# Patient Record
Sex: MALE | Race: WHITE
[De-identification: names, ages, dates, MRNs, and addresses within clinical notes are randomized per-mention and may not be internally consistent; named-entity substitution may affect disease eponyms.]

---

## 2017-12-02 ENCOUNTER — HOSPITAL ENCOUNTER (INPATIENT)
Dept: HOSPITAL 92 - ERS | Age: 62
LOS: 4 days | Discharge: HOME | DRG: 100 | End: 2017-12-06
Attending: INTERNAL MEDICINE | Admitting: INTERNAL MEDICINE
Payer: COMMERCIAL

## 2017-12-02 VITALS — BODY MASS INDEX: 18.8 KG/M2

## 2017-12-02 DIAGNOSIS — F10.230: ICD-10-CM

## 2017-12-02 DIAGNOSIS — I10: ICD-10-CM

## 2017-12-02 DIAGNOSIS — E86.0: ICD-10-CM

## 2017-12-02 DIAGNOSIS — G40.409: Primary | ICD-10-CM

## 2017-12-02 DIAGNOSIS — D72.829: ICD-10-CM

## 2017-12-02 DIAGNOSIS — Z87.891: ICD-10-CM

## 2017-12-02 DIAGNOSIS — Z87.81: ICD-10-CM

## 2017-12-02 DIAGNOSIS — J44.9: ICD-10-CM

## 2017-12-02 DIAGNOSIS — G93.41: ICD-10-CM

## 2017-12-02 DIAGNOSIS — E87.1: ICD-10-CM

## 2017-12-02 DIAGNOSIS — E87.2: ICD-10-CM

## 2017-12-02 DIAGNOSIS — E87.6: ICD-10-CM

## 2017-12-02 DIAGNOSIS — Z88.8: ICD-10-CM

## 2017-12-02 LAB
ALP SERPL-CCNC: 81 U/L (ref 40–150)
ALT SERPL W P-5'-P-CCNC: 31 U/L (ref 8–55)
ANION GAP SERPL CALC-SCNC: 18 MMOL/L (ref 10–20)
AST SERPL-CCNC: 44 U/L (ref 5–34)
BACTERIA UR QL AUTO: (no result) HPF
BASOPHILS # BLD AUTO: 0 THOU/UL (ref 0–0.2)
BASOPHILS NFR BLD AUTO: 0.2 % (ref 0–1)
BILIRUB SERPL-MCNC: 1.3 MG/DL (ref 0.2–1.2)
BUN SERPL-MCNC: 6 MG/DL (ref 8.4–25.7)
CALCIUM SERPL-MCNC: 9.8 MG/DL (ref 7.8–10.44)
CHLORIDE SERPL-SCNC: 102 MMOL/L (ref 98–107)
CO2 SERPL-SCNC: 20 MMOL/L (ref 23–31)
CREAT CL PREDICTED SERPL C-G-VRATE: 0 ML/MIN (ref 70–130)
EOSINOPHIL # BLD AUTO: 0 THOU/UL (ref 0–0.7)
EOSINOPHIL NFR BLD AUTO: 0.1 % (ref 0–10)
GLOBULIN SER CALC-MCNC: 3.7 G/DL (ref 2.4–3.5)
GLUCOSE CSF-MCNC: 88 MG/DL (ref 40–70)
HCT VFR BLD CALC: 48.4 % (ref 42–52)
HYALINE CASTS #/AREA URNS LPF: (no result) LPF
LACTATE SERPL-SCNC: 5 MMOL/L (ref 0.5–2.2)
LYMPHOCYTES # BLD: 1 THOU/UL (ref 1.2–3.4)
LYMPHOCYTES NFR BLD AUTO: 7 % (ref 21–51)
MONOCYTES # BLD AUTO: 0.7 THOU/UL (ref 0.11–0.59)
MONOCYTES NFR BLD AUTO: 4.9 % (ref 0–10)
NEUTROPHILS # BLD AUTO: 12.8 THOU/UL (ref 1.4–6.5)
PROT UR STRIP.AUTO-MCNC: 300 MG/DL
RBC # BLD AUTO: 4.69 MILL/UL (ref 4.7–6.1)
RBC UR QL AUTO: (no result) HPF (ref 0–3)
WBC # BLD AUTO: 14.6 THOU/UL (ref 4.8–10.8)
WBC UR QL AUTO: (no result) HPF (ref 0–3)

## 2017-12-02 PROCEDURE — 70450 CT HEAD/BRAIN W/O DYE: CPT

## 2017-12-02 PROCEDURE — 96375 TX/PRO/DX INJ NEW DRUG ADDON: CPT

## 2017-12-02 PROCEDURE — 71010: CPT

## 2017-12-02 PROCEDURE — 80053 COMPREHEN METABOLIC PANEL: CPT

## 2017-12-02 PROCEDURE — A4216 STERILE WATER/SALINE, 10 ML: HCPCS

## 2017-12-02 PROCEDURE — 84157 ASSAY OF PROTEIN OTHER: CPT

## 2017-12-02 PROCEDURE — 82746 ASSAY OF FOLIC ACID SERUM: CPT

## 2017-12-02 PROCEDURE — 87205 SMEAR GRAM STAIN: CPT

## 2017-12-02 PROCEDURE — 009U3ZX DRAINAGE OF SPINAL CANAL, PERCUTANEOUS APPROACH, DIAGNOSTIC: ICD-10-PCS | Performed by: RADIOLOGY

## 2017-12-02 PROCEDURE — 81003 URINALYSIS AUTO W/O SCOPE: CPT

## 2017-12-02 PROCEDURE — 82945 GLUCOSE OTHER FLUID: CPT

## 2017-12-02 PROCEDURE — 87070 CULTURE OTHR SPECIMN AEROBIC: CPT

## 2017-12-02 PROCEDURE — 96365 THER/PROPH/DIAG IV INF INIT: CPT

## 2017-12-02 PROCEDURE — 83735 ASSAY OF MAGNESIUM: CPT

## 2017-12-02 PROCEDURE — 93005 ELECTROCARDIOGRAM TRACING: CPT

## 2017-12-02 PROCEDURE — 80048 BASIC METABOLIC PNL TOTAL CA: CPT

## 2017-12-02 PROCEDURE — 95819 EEG AWAKE AND ASLEEP: CPT

## 2017-12-02 PROCEDURE — 84100 ASSAY OF PHOSPHORUS: CPT

## 2017-12-02 PROCEDURE — 36415 COLL VENOUS BLD VENIPUNCTURE: CPT

## 2017-12-02 PROCEDURE — 87040 BLOOD CULTURE FOR BACTERIA: CPT

## 2017-12-02 PROCEDURE — S0028 INJECTION, FAMOTIDINE, 20 MG: HCPCS

## 2017-12-02 PROCEDURE — 89051 BODY FLUID CELL COUNT: CPT

## 2017-12-02 PROCEDURE — 62270 DX LMBR SPI PNXR: CPT

## 2017-12-02 PROCEDURE — 80307 DRUG TEST PRSMV CHEM ANLYZR: CPT

## 2017-12-02 PROCEDURE — 95816 EEG AWAKE AND DROWSY: CPT

## 2017-12-02 PROCEDURE — 81015 MICROSCOPIC EXAM OF URINE: CPT

## 2017-12-02 PROCEDURE — 96376 TX/PRO/DX INJ SAME DRUG ADON: CPT

## 2017-12-02 PROCEDURE — 96361 HYDRATE IV INFUSION ADD-ON: CPT

## 2017-12-02 PROCEDURE — 85025 COMPLETE CBC W/AUTO DIFF WBC: CPT

## 2017-12-02 PROCEDURE — 70553 MRI BRAIN STEM W/O & W/DYE: CPT

## 2017-12-02 PROCEDURE — 51701 INSERT BLADDER CATHETER: CPT

## 2017-12-02 PROCEDURE — 82607 VITAMIN B-12: CPT

## 2017-12-02 PROCEDURE — 80202 ASSAY OF VANCOMYCIN: CPT

## 2017-12-02 PROCEDURE — 83605 ASSAY OF LACTIC ACID: CPT

## 2017-12-02 RX ADMIN — POTASSIUM CHLORIDE SCH MLS: 149 INJECTION, SOLUTION, CONCENTRATE INTRAVENOUS at 22:15

## 2017-12-02 RX ADMIN — Medication SCH ML: at 22:39

## 2017-12-02 RX ADMIN — FAMOTIDINE SCH MG: 10 INJECTION, SOLUTION INTRAVENOUS at 22:39

## 2017-12-02 NOTE — HP
HISTORY OF PRESENT ILLNESS:  Mr. Sarah is a 62-year-old  male.  He was in his usual state 
of health until he developed 3 episodes of seizure earlier today and subsequently, he was also notice
d to be febrile.  In view of this fact, it was felt that he may have meningitis and radiologist was c
onsulted by the ER physician for possible lumbar tap.  He is known to have history of seizure disorde
r as a child.  Last time; however, he has not been on any medication and he had one episode of seizur
e about 3 months ago.

 

PAST MEDICAL HISTORY:

History is obtained from his wife.  He is currently postictal.  He is known to have any history of hy
pertension, COPD, atrial fibrillation.

 

PAST SURGICAL HISTORY:  Remarkable for open reduction internal fixation of the right arm fracture and
 also he had some oral surgery.  He does not have any known allergy; however, he describes some side 
effect to Ativan, mainly agitation.

 

SOCIAL HISTORY:  He is a former smoker.  We could not quantify his smoking habit.  He does have a his
tory of ETOH abuse and according to the wife, he drinks 5-8 cans of beer a day.

 

MEDICATIONS:  His home medications are not available for identification at this time; however, his wi
fe describes some arrhythmia for which he has been on Eliquis.

 

REVIEW OF SYSTEM:  As we mentioned earlier, generalized seizure earlier today multiple 3 episodes fol
lowed by fever.  All other systems were reviewed and are negative.

 

PHYSICAL EXAMINATION:

GENERAL:  At the current time he is drowsy, irritable and confused.

VITAL SIGNS:  Temperature of 102 with pulse rate 116, respiratory rate 28, blood pressure 190/105.

HEENT:  Normocephalic and atraumatic.  Both his pupils are equal, reacting.  Ears and nose normal.  O
ral mucosa is moist.  We could not see his pharyngeal area.  He is not cooperating.

NECK:  Supple.  There is no distention of the jugular veins.  No lymphadenopathy felt.  Thyroid gland
 not palpable.  There is no carotid bruit.

CHEST:  Symmetrical with regular S1, S2.

LUNGS:  Clear.

ABDOMEN:  Soft.  Bowel sounds heard.  We could not appreciate any organomegaly.  There is no focal ar
ea of tenderness, 

EXTREMITIES:  Limbs show no edema.  Neurologically, he moves all extremities.

 

LABORATORY DATA:  His CBC showed WBC of 14.6, hemoglobin of 15.8, hematocrit of 48.4, MCV of 103, denys
telet of 176 with 87.7% of polynuclear neutrophils.  Chemistry and lytes showed a sodium of 136, pota
ssium 4.4, chloride 102, CO2 of 20, BUN 6, creatinine 0.91, glucose 126, lactic acid 5, calcium 9.8, 
total bilirubin 1.3, AST 44, ALT 31, alkaline phosphatase 81, total protein 7.9, albumin 4.2.  Urinal
ysis shows specific gravity of 1.019, pH of 6, protein 300 mg per deciliter, ketone positive, blood m
oderate, nitrite negative, leukocyte esterase negative, 4-6 rbc's, 0-3 wbc's, 0-3 epithelial cells, 0
-3, hyaline cast.  Alcohol level is less than 10.  Head CT done earlier was reported to show no acute
 intracranial abnormality.  Chest x-ray was reported to show no active intrathoracic disease.

 

ASSESSMENT:  This is a 62-year-old  male with history of hypertension, COPD, possible atrial
 fibrillation, ETOH abuse, who is being admitted with new onset seizure, was noticed to have high gra
de fever.  He is currently being treated for possible meningitis; however, we discussed the possibili
ty of alcohol withdrawal with seizure cannot be excluded at this time.

 

Radiologist was consulted by the ER physician for lumbar tap.  He is aware that the patient was on [place] and he is agreeable to help us with lumbar tap.  The patient will be admitted to medical ICU.  
He will be started on ceftriaxone and vancomycin.  We will also treat him for alcohol withdrawal.  Fu
rther evaluation and management will depend on the course of his hospitalization and his response to 
therapy.

## 2017-12-02 NOTE — CT
CT OF BRAIN PERFORMED WITHOUT CONTRAST ENHANCEMENT:

 

HISTORY: 

Seizure.

 

FINDINGS: 

The ventricular and cisternal system shows some generalized atrophy.  There are no signs of intracere
bral hemorrhage or extraaxial fluid collections.  Mastoid air cells are clear.  There is opacificatio
n of the left frontal sinus.

 

IMPRESSION: 

No acute intracranial abnormalities.

 

POS: [facility]

## 2017-12-02 NOTE — RAD
PORTABLE CHEST:

12/2/17

 

HISTORY: 

Fever. Patient also has had seizure witnessed by family. 

 

Heart size and mediastinum are within normal limits. The lungs are clear of infiltrates. No bony find
ings. 

 

IMPRESSION:  

No active intrathoracic disease. 

 

POS: [facility]

## 2017-12-02 NOTE — PDOC.EVN
Event Note





- Event Note


Event Note: 





RN called - No lab orders for CSF studies. Also patient cannot take PO Valium 

due to agitation


Plan:


* Send CSF for cell count, culture, chem profile


* Add IV Valium PRN (Patient is allergic to Lorazepam - reaction unknown - no 

family at bedside

## 2017-12-02 NOTE — RAD
FLUOROSCOPIC GUIDED LUMBAR PUNCTURE

12/2/17

 

INDICATION:

Altered mental status. Concern for meningitis. 

 

CLINICAL INFORMATION:

The patient is currently on Eliquis for atrial fibrillation. Due to the emergency nature of the patie
nt's suspected meningitis, a lumbar guided puncture was felt to be clinically indicated with the bene
fits outweighing the risks for spinal hematoma. This was discussed with Dr. Delatorre prior to performi
ng the examination. The patient was on no other known antiplatelet or anticoagulant medication. The p
atient's renal function is within normal limits. Due to the patient being somewhat altered and uncoop
erative, the anesthesia department assisted in general anesthesia for this patient. Please see their 
record for full details concerning the patient's anesthesia. 

 

TECHNIQUE:  

The patient's wife was consented for the lumbar puncture in the Emergency Room. Once the patient was 
intubated by the Anesthesia department and placed in prone position on the fluoroscopic table. Site o
verlying the left L2-3 interlaminar space was marked. This site was prepped and draped in the usual s
terile fashion. Buffered 1% lidocaine was administered overlying subcutaneous tissues. Under fluorosc
opic guidance, a 22 gauge spinal needle was guided down into the thecal sac. There was spontaneous re
turn of normal appearing CSF fluid. Following this, 8 mL of normal appearing CSF fluid was obtained i
n four separate sample containers. The inner stylet was replaced within the needle and the needle was
 removed. The site was the cleansed and bandaged. Please see the anesthesia record for full details c
oncerning the postprocedure recovery. 

 

FLUOROSCOPIC TIME:

0.3 minutes.

 

IMPRESSION:  

Successful fluoroscopic guided lumbar puncture with removal of 8 mL of normal appearing CSF fluid. 

 

POS: [facility]

## 2017-12-03 RX ADMIN — Medication SCH ML: at 19:31

## 2017-12-03 RX ADMIN — CEFTRIAXONE SCH MLS: 2 INJECTION, POWDER, FOR SOLUTION INTRAMUSCULAR; INTRAVENOUS at 16:48

## 2017-12-03 RX ADMIN — FAMOTIDINE SCH MG: 10 INJECTION, SOLUTION INTRAVENOUS at 08:48

## 2017-12-03 RX ADMIN — POTASSIUM CHLORIDE SCH MLS: 149 INJECTION, SOLUTION, CONCENTRATE INTRAVENOUS at 12:42

## 2017-12-03 RX ADMIN — FAMOTIDINE SCH MG: 10 INJECTION, SOLUTION INTRAVENOUS at 19:24

## 2017-12-03 RX ADMIN — Medication SCH ML: at 08:49

## 2017-12-03 RX ADMIN — CEFTRIAXONE SCH MLS: 2 INJECTION, POWDER, FOR SOLUTION INTRAMUSCULAR; INTRAVENOUS at 05:26

## 2017-12-03 NOTE — PDOC.PN
- Subjective


Encounter Start Date: 12/03/17


Encounter Start Time: 12:00





Alert, not agitated.


Not oriented.





- Objective


Resuscitation Status: 


 











Resuscitation Status           FULL:Full Resuscitation














Vital Signs & Weight: 


 Vital Signs (12 hours)











  Temp Pulse Resp BP Pulse Ox


 


 12/03/17 08:00  98.4 F  83  18   97


 


 12/03/17 07:22  98.4 F  83  18  146/64 H  97


 


 12/03/17 06:41      97


 


 12/03/17 04:00  98.3 F  86  20  140/73  97








 Weight











Weight                         138 lb 11.2 oz














I&O: 


 











 12/02/17 12/03/17 12/04/17





 06:59 06:59 06:59


 


Intake Total  840 


 


Output Total  550 


 


Balance  290 











Result Diagrams: 


 12/02/17 15:08





 12/02/17 15:08





Phys Exam





- Physical Examination


HEENT: sclera anicteric


Neck: no JVD


Respiratory: clear to auscultation bilateral


Cardiovascular: RRR


Gastrointestinal: soft


Musculoskeletal: no edema


Neurological: moves all 4 limbs (Not oriented..)





Dx/Plan


(1) Seizure


Code(s): R56.9 - UNSPECIFIED CONVULSIONS   Status: Acute   Comment: Most likely 

from ETOH withdrawl.


Continue Valium, D5W, thiamine,    





(2) HTN (hypertension)


Code(s): I10 - ESSENTIAL (PRIMARY) HYPERTENSION   Status: Acute   Comment: BP 

satisfactory..   





(3) COPD (chronic obstructive pulmonary disease)


Status: Acute   Comment: stable..   





(4) Meningitis


Code(s): G03.9 - MENINGITIS, UNSPECIFIED   Status: Acute   Comment: CSF study 

does not suggest meningitis..


Fever was most likely central, due to seizure.


f/u BxC   





- Plan





* .

## 2017-12-04 LAB
ALP SERPL-CCNC: 52 U/L (ref 40–150)
ALT SERPL W P-5'-P-CCNC: 25 U/L (ref 8–55)
ANION GAP SERPL CALC-SCNC: 10 MMOL/L (ref 10–20)
AST SERPL-CCNC: 44 U/L (ref 5–34)
BASOPHILS # BLD AUTO: 0.1 THOU/UL (ref 0–0.2)
BASOPHILS NFR BLD AUTO: 0.5 % (ref 0–1)
BILIRUB SERPL-MCNC: 0.9 MG/DL (ref 0.2–1.2)
BUN SERPL-MCNC: 4 MG/DL (ref 8.4–25.7)
CALCIUM SERPL-MCNC: 9 MG/DL (ref 7.8–10.44)
CHLORIDE SERPL-SCNC: 100 MMOL/L (ref 98–107)
CO2 SERPL-SCNC: 27 MMOL/L (ref 23–31)
CREAT CL PREDICTED SERPL C-G-VRATE: 97 ML/MIN (ref 70–130)
EOSINOPHIL # BLD AUTO: 0.1 THOU/UL (ref 0–0.7)
EOSINOPHIL NFR BLD AUTO: 0.5 % (ref 0–10)
GLOBULIN SER CALC-MCNC: 3.1 G/DL (ref 2.4–3.5)
HCT VFR BLD CALC: 40.7 % (ref 42–52)
LYMPHOCYTES # BLD: 1.3 THOU/UL (ref 1.2–3.4)
LYMPHOCYTES NFR BLD AUTO: 13.4 % (ref 21–51)
MACROCYTES BLD QL SMEAR: (no result) (100X)
MAGNESIUM SERPL-MCNC: 2 MG/DL (ref 1.6–2.6)
MONOCYTES # BLD AUTO: 0.5 THOU/UL (ref 0.11–0.59)
MONOCYTES NFR BLD AUTO: 5.4 % (ref 0–10)
NEUTROPHILS # BLD AUTO: 7.9 THOU/UL (ref 1.4–6.5)
RBC # BLD AUTO: 4 MILL/UL (ref 4.7–6.1)
VANCOMYCIN TROUGH SERPL-MCNC: 3.3 UG/ML
WBC # BLD AUTO: 9.9 THOU/UL (ref 4.8–10.8)

## 2017-12-04 RX ADMIN — FAMOTIDINE SCH MG: 10 INJECTION, SOLUTION INTRAVENOUS at 21:02

## 2017-12-04 RX ADMIN — CEFTRIAXONE SCH MLS: 2 INJECTION, POWDER, FOR SOLUTION INTRAMUSCULAR; INTRAVENOUS at 17:45

## 2017-12-04 RX ADMIN — POTASSIUM CHLORIDE SCH MLS: 149 INJECTION, SOLUTION, CONCENTRATE INTRAVENOUS at 03:37

## 2017-12-04 RX ADMIN — Medication SCH ML: at 21:02

## 2017-12-04 RX ADMIN — Medication SCH ML: at 09:08

## 2017-12-04 RX ADMIN — CEFTRIAXONE SCH MLS: 2 INJECTION, POWDER, FOR SOLUTION INTRAMUSCULAR; INTRAVENOUS at 05:23

## 2017-12-04 RX ADMIN — FAMOTIDINE SCH MG: 10 INJECTION, SOLUTION INTRAVENOUS at 09:05

## 2017-12-04 RX ADMIN — POTASSIUM CHLORIDE, DEXTROSE MONOHYDRATE AND SODIUM CHLORIDE SCH MLS: 150; 5; 450 INJECTION, SOLUTION INTRAVENOUS at 14:44

## 2017-12-04 NOTE — PDOC.PN
- Subjective


Encounter Start Date: 12/04/17


Encounter Start Time: 13:30





Patient seen and examined. No new complaints. No overnight events. Mentation 

improving. No fever.





- Objective


Resuscitation Status: 


 











Resuscitation Status           FULL:Full Resuscitation














MAR Reviewed: Yes


Vital Signs & Weight: 


 Vital Signs (12 hours)











  Temp Pulse Resp BP Pulse Ox


 


 12/04/17 16:00  97.8 F  76  16  150/82 H  99


 


 12/04/17 11:07  98.0 F  85  16  172/80 H 


 


 12/04/17 08:00  98.0 F  85  16   97


 


 12/04/17 07:36  98.1 F  95  20  141/107 H  97








 Weight











Admit Weight                   138 lb 11.2 oz


 


Weight                         138 lb 11.2 oz














I&O: 


 











 12/03/17 12/04/17 12/05/17





 06:59 06:59 06:59


 


Intake Total 840 2492 1050


 


Output Total 550 400 750


 


Balance 290 2092 300











Result Diagrams: 


 12/04/17 13:02





 12/04/17 13:02


EKG Reviewed by me: Yes (Tele SR)





Phys Exam





- Physical Examination


Constitutional: NAD


Respiratory: no wheezing, no rhonchi


Cardiovascular: RRR, no rub


Gastrointestinal: soft, non-tender, positive bowel sounds


Musculoskeletal: no edema


Neurological: non-focal, normal sensation, moves all 4 limbs





Dx/Plan





- Plan


DVT proph w/SCDs





IMPRESSION:


1. Toxic Metabolic Encephalopathy - prob due to seizure


2. Seizure 


3. Chronic Alcoholism


4. COPD


5. Lactic acidosis - resolved





PLAN:


* EEG pend


* Consult Neuro


* Cont Keppra


* Critical care following


* Cont to monitor


* Seizure precautions


* Monitor closely for alcohol withdrawal








Review of Systems





- Review of Systems


Respiratory: negative: Cough, Dry, Shortness of Breath, Hemoptysis, SOB with 

Excertion, Pleuritic Pain, Sputum, Wheezing


Cardiovascular: negative: Chest Pain, Palpitations, Orthopnea, Paroxysmal Noc. 

Dyspnea, Edema, Light Headedness





- Medications/Allergies


Allergies/Adverse Reactions: 


 Allergies











Allergy/AdvReac Type Severity Reaction Status Date / Time


 


lorazepam [From Ativan] Allergy   Verified 12/02/17 21:01











Medications: 


 Current Medications





Acetaminophen (Tylenol)  650 mg PO Q4H PRN


   PRN Reason: Headache/Fever or Pain


Cyanocobalamin (Vitamin B-12)  1,000 mcg PO DAILY [facility]


Diazepam (Valium)  5 mg PO Q4H PRN


   PRN Reason: FOR ASE 10 OR GREATER


   Last Admin: 12/04/17 10:44 Dose:  5 mg


Famotidine (Pepcid)  20 mg SLOW IVP Q12HR [facility]


   Last Admin: 12/04/17 09:05 Dose:  20 mg


Folic Acid (Folvite)  1 mg PO DAILY [facility]


   Last Admin: 12/04/17 09:06 Dose:  1 mg


Folic Acid (Folvite)  1 mg PO 1300 [facility]


   Last Admin: 12/04/17 14:44 Dose:  1 mg


Haloperidol Lactate (Haldol)  10 mg IM Q4H PRN


   PRN Reason: Agitation


   Last Admin: 12/04/17 10:51 Dose:  10 mg


Ceftriaxone Sodium 2 gm/ (Sodium Chloride)  100 mls @ 200 mls/hr IVPB 0500,1700 

[facility]


   Last Admin: 12/04/17 17:45 Dose:  100 mls


Levetiracetam 500 mg/ Device  100 mls @ 200 mls/hr IVPB BID [facility]


   Last Admin: 12/04/17 09:07 Dose:  100 mls


Potassium Chloride/Dextrose/Sod Cl (D5 1/2 Ns W/20 Meq Kcl)  1,000 mls @ 75 mls/

hr IV .[id number] [facility]


   Last Admin: 12/04/17 14:44 Dose:  1,000 mls


Magnesium Oxide (Magnesium Oxide)  400 mg PO DAILY [facility]


   Last Admin: 12/04/17 09:06 Dose:  400 mg


Miscellaneous (Ase Protocol)  1 each FS ONE [facility]


   Stop: 12/12/17 19:43


Multivitamins (Theragran)  1 tab PO DAILY [facility]


Potassium Chloride (Klor-Con 10)  10 meq PO TID-WM [facility]


Sodium Chloride (Flush - Normal Saline)  10 ml IVF Q12HR [facility]


   Last Admin: 12/04/17 09:08 Dose:  10 ml


Sodium Chloride (Flush - Normal Saline)  10 ml IVF PRN PRN


   PRN Reason: Saline Flush


Thiamine HCl (Thiamine)  100 mg PO 1300 [facility]


   Last Admin: 12/04/17 14:44 Dose:  100 mg

## 2017-12-04 NOTE — PRG
DATE OF SERVICE:  12/04/2017

 

Mr. Sarah is confused.  He is oriented x1.

 

PHYSICAL EXAMINATION: 

VITAL SIGNS:  He is afebrile, heart rate is 85, respiratory rate 16, blood pressure 172/80s.

LUNGS:  Clear.

HEART:  Regular rhythm.

ABDOMEN:  Soft.

NEURO:  He has had no more seizures.

 

LABORATORY DATA:  There is no new lab.

 

IMPRESSION:  Seizures.  He needs an MRI with and without contrast.  He needs Neurology input.  He has
 had an EEG today.

 

We will continue with Keppra for now.  I do not think this is an alcohol withdrawal seizure.  Alcohol
 may have uncovered recurrence of his seizure disorder, he still could potentially have a structural 
brain abnormality since the only imaging we have is a noncontrast head CT.  Neurology has been consul
bruce.

## 2017-12-04 NOTE — CON
DATE OF CONSULTATION:  12/03/2017

 

HISTORY OF PRESENT ILLNESS:  Mr. Sarah is a 62-year-old male.  His wife is a labor and delivery radha
.  She gives an excellent history.  She tells me that Mr. Sarah had a history of seizures when he
 was a child.  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  He was left w
ith a seizure disorder for which he took phenobarbital for many years.  He eventually was weaned off 
his phenobarbital, had no recurrence of seizures and then one month ago had a grand-mal seizure.  His
 wife did not take him for medical care because she did not feel he needed it.  He recovered relative
ly quickly.  Says he drinks 5-10 cans of beer a day but has not quit drinking and she is very confide
nt stating that he does not drink more than what she is telling me.  He has not quit drinking recentl
y and his last alcohol was the day before yesterday.  Yesterday, she says that she came home from [facility] after working the night shift got into bed.  An hour later, she woke up and he was still asleep whi
ch is unusual.  Shortly thereafter, he had a seizure.  She felt him shaking in the bed and says it wa
s tonic-clonic generalized seizure associated with extreme warmth.  She said he felt febrile.

 

He had 2 more episodes before EMS arrived and then 1 episode after EMS arrived.  He subsequently was 
admitted and underwent a lumbar puncture.  It shows an elevated glucose, elevated protein, it is acel
lular, cultures were negative so far.

 

There were only 7 red cells and 2 white cells.

 

I was consulted because of his presence in the IMU.  He has become a little confused this afternoon.

 

PAST MEDICAL HISTORY:

1.  Remarkable for a right arm fracture requiring ORIF.

2.  He had a history of pneumonia with sepsis.  He was ventilated for 3 weeks at [facility].  She says she made him a DNR.  They never reached a point where they performed a tracheos
jimmie and then all of a sudden he started improving.  He had a renal failure with that, but I do not b
elieve he required dialysis.

3.  She also provides a history that he tripped over something in the bedroom when he got up to go to
 the bathroom and sustained a C2 fracture earlier this year and had a halo placed.

 

FAMILY HISTORY:  Negative for lung disease at an early age.

 

REVIEW OF SYSTEMS:  Otherwise, negative.

 

SOCIAL HISTORY:  He is a nonsmoker, did smoke in the past, never been told he has COPD.

 

PHYSICAL EXAMINATION:

VITAL SIGNS:  He is afebrile, heart rate is 86, respiratory rate is 20, oximetry is 96, blood pressur
e 141/74.

GENERAL:  He answers directed questions but is combative and picks at things, so he has been restrain
ed.  We released his strengths and then he started arm wrestling with his wife wanting to get out of 
bed, so we replaced his restraints.

HEENT:  His pupils are equal.  Sclerae are anicteric.

NECK:  Supple.  She said he did this before when he had his seizure, but it gradually cleared.

LUNGS:  Clear.

HEART:  Regular rhythm.  S1 and S2 are normal.

ABDOMEN:  Soft and nontender.

EXTREMITIES:  Without asymmetry.

 

LABORATORY AND X-RAY FINDINGS:  He had a head CT without contrast in the emergency department that di
d not show a bleed.

 

IMPRESSION:  Recurrent seizure disorder.  He definitely needs at some point a contrast MRI.

 

All structural abnormalities have not been ruled out with a noncontrast brain CT.  Chest radiograph d
oes not show anything to suggest that he has pneumonia.

 

His wife says he was drinking right up until he had a seizure, so this does not appear to be an alcoh
ol withdrawal seizure.  The 1 he had a month ago definitely was an alcohol withdrawal related.

 

He may be having subclinical seizures and then confused with that.  At any point in time, he needs an
 EEG and Neurology input or an MRI of his brain with and without contrast.  Recommended one dose of H
aldol.  He gets hyperactive with Ativan, so this is not on the MAR.  He tolerates Valium fine accordi
ng to the wife.

 

I will load him with Keppra.  Neurology consultation should be obtained tomorrow in the morning.

## 2017-12-05 LAB
ANION GAP SERPL CALC-SCNC: 12 MMOL/L (ref 10–20)
BUN SERPL-MCNC: 4 MG/DL (ref 8.4–25.7)
CALCIUM SERPL-MCNC: 9.1 MG/DL (ref 7.8–10.44)
CHLORIDE SERPL-SCNC: 101 MMOL/L (ref 98–107)
CO2 SERPL-SCNC: 24 MMOL/L (ref 23–31)
CREAT CL PREDICTED SERPL C-G-VRATE: 105 ML/MIN (ref 70–130)

## 2017-12-05 RX ADMIN — THERA TABS SCH TAB: TAB at 08:15

## 2017-12-05 RX ADMIN — CYANOCOBALAMIN TAB 1000 MCG SCH MCG: 1000 TAB at 08:15

## 2017-12-05 RX ADMIN — FAMOTIDINE SCH MG: 10 INJECTION, SOLUTION INTRAVENOUS at 22:00

## 2017-12-05 RX ADMIN — POTASSIUM CHLORIDE, DEXTROSE MONOHYDRATE AND SODIUM CHLORIDE SCH MLS: 150; 5; 450 INJECTION, SOLUTION INTRAVENOUS at 04:29

## 2017-12-05 RX ADMIN — Medication SCH ML: at 22:01

## 2017-12-05 RX ADMIN — CEFTRIAXONE SCH MLS: 2 INJECTION, POWDER, FOR SOLUTION INTRAMUSCULAR; INTRAVENOUS at 04:30

## 2017-12-05 RX ADMIN — FAMOTIDINE SCH MG: 10 INJECTION, SOLUTION INTRAVENOUS at 08:13

## 2017-12-05 RX ADMIN — Medication SCH: at 08:16

## 2017-12-05 NOTE — PDOC.PN
- Subjective


Encounter Start Date: 12/05/17


Encounter Start Time: 09:30





Patient seen and examined. No new complaints. No overnight events





- Objective


Resuscitation Status: 


 











Resuscitation Status           FULL:Full Resuscitation














MAR Reviewed: Yes


Vital Signs & Weight: 


 Vital Signs (12 hours)











  Temp Pulse Resp BP BP BP Pulse Ox


 


 12/05/17 20:17     184/100 H   


 


 12/05/17 20:08  98.0 F  88  16    184/100 H  98


 


 12/05/17 15:46  97.6 F  97  20   163/101 H   98


 


 12/05/17 11:28  98.0 F  90  20   137/79   96








 Weight











Admit Weight                   138 lb 11.2 oz


 


Weight                         138 lb 11.2 oz














I&O: 


 











 12/04/17 12/05/17 12/06/17





 06:59 06:59 06:59


 


Intake Total 2492 1050 1400


 


Output Total 400 750 600


 


Balance 2092 300 800











Result Diagrams: 


 12/04/17 13:02





 12/06/17 05:14


EKG Reviewed by me: Yes (Tele SR)





Phys Exam





- Physical Examination


Constitutional: NAD


Respiratory: no wheezing, no rhonchi


Cardiovascular: RRR, no rub


Gastrointestinal: soft, non-tender, positive bowel sounds


Musculoskeletal: no edema


Neurological: non-focal, normal sensation, moves all 4 limbs





Dx/Plan





- Plan


DVT proph w/SCDs





IMPRESSION:


1. Toxic Metabolic Encephalopathy - prob due to seizure - no new episodes


2. Seizure 


3. Chronic Alcoholism


4. COPD


5. Lactic acidosis - resolved





PLAN:


* EEG 


* Await Neuro


* Cont Keppra


* Critical care following


* Cont to monitor


* Seizure precautions


* on alcohol withdrawal protocol








Review of Systems





- Review of Systems


Constitutional: negative: Fever, Chills, Sweats, Weakness, Malaise, Other


Respiratory: negative: Cough, Dry, Shortness of Breath, Hemoptysis, SOB with 

Excertion, Pleuritic Pain, Sputum, Wheezing


Cardiovascular: negative: Chest Pain, Palpitations, Orthopnea, Paroxysmal Noc. 

Dyspnea, Edema, Light Headedness





- Medications/Allergies


Allergies/Adverse Reactions: 


 Allergies











Allergy/AdvReac Type Severity Reaction Status Date / Time


 


lorazepam [From Ativan] Allergy   Verified 12/02/17 21:01











Medications: 


 Current Medications





Acetaminophen (Tylenol)  650 mg PO Q4H PRN


   PRN Reason: Headache/Fever or Pain


Cefuroxime Axetil (Ceftin)  250 mg PO Q12HR [facility]


   Last Admin: 12/05/17 22:01 Dose:  250 mg


Clonidine (Catapres)  0.1 mg PO Q6H PRN


   PRN Reason: SBP > 180


   Last Admin: 12/04/17 21:41 Dose:  0.1 mg


Cyanocobalamin (Vitamin B-12)  1,000 mcg PO DAILY [facility]


   Last Admin: 12/05/17 08:15 Dose:  1,000 mcg


Diazepam (Valium)  5 mg PO Q4H PRN


   PRN Reason: FOR ASE 10 OR GREATER


   Last Admin: 12/05/17 15:17 Dose:  5 mg


Famotidine (Pepcid)  20 mg SLOW IVP Q12HR [facility]


   Last Admin: 12/05/17 22:00 Dose:  20 mg


Folic Acid (Folvite)  1 mg PO DAILY [facility]


   Last Admin: 12/05/17 08:15 Dose:  1 mg


Haloperidol Lactate (Haldol)  10 mg IM Q4H PRN


   PRN Reason: Agitation


   Last Admin: 12/05/17 15:17 Dose:  10 mg


Levetiracetam (Keppra)  500 mg PO BID [facility]


   Last Admin: 12/05/17 22:01 Dose:  500 mg


Magnesium Oxide (Magnesium Oxide)  400 mg PO DAILY [facility]


   Last Admin: 12/05/17 08:14 Dose:  400 mg


Miscellaneous (Ase Protocol)  1 each FS ONE [facility]


   Stop: 12/12/17 19:43


Multivitamins (Theragran)  1 tab PO DAILY [facility]


   Last Admin: 12/05/17 08:15 Dose:  1 tab


Potassium Chloride (Klor-Con 10)  10 meq PO TID-WM [facility]


   Last Admin: 12/05/17 17:16 Dose:  10 meq


Sodium Chloride (Flush - Normal Saline)  10 ml IVF Q12HR [facility]


   Last Admin: 12/05/17 22:01 Dose:  10 ml


Sodium Chloride (Flush - Normal Saline)  10 ml IVF PRN PRN


   PRN Reason: Saline Flush


Thiamine HCl (Thiamine)  100 mg PO 1300 [facility]


   Last Admin: 12/05/17 12:37 Dose:  100 mg

## 2017-12-05 NOTE — CON
DATE OF CONSULTATION:  12/04/2017

 

REASON FOR CONSULTATION:  Seizures.

 

HISTORY OF PRESENT ILLNESS:  Mr. Sarah is a pleasant 62-year-old  male who has been consul
bruce for evaluation of seizures.  History is obtained from the patient and the patient's wife who was 
present at bedside.  Wife reports that he has a history of seizure in childhood.  He has been off his
 medications since childhood as he has been seizure free.  He has not had any seizure up until last n
ight.  She states that they were sleeping and suddenly he started having generalized convulsions.  Th
is lasted approximately 30-40 seconds followed by drowsiness, 30 minutes later, he had another episod
e of generalized convulsions that lasted for about a minute followed by a few minutes later followed 
by another episode, she called EMS and on arrival of the EMS, he had another episode of seizure.  He 
does drink alcohol on a daily basis.  Per nurse, he has been reported to drink approximately 6 beers 
per day.  Currently, denies any headache, chest pain, palpitation, numbness, tingling, and weakness.

 

PAST MEDICAL HISTORY:  Significant for hypertension, COPD, atrial fibrillation.

 

PAST SURGICAL HISTORY:  Significant for right arm surgery and oral surgery.

 

SOCIAL HISTORY:  He is a former smoker.  He has a history of alcohol use and drinks approximately 6 b
eers on a daily basis.  He denies illicit drug use.

 

CURRENT MEDICATIONS:  Please review MAR.

 

ALLERGIES:  Include ATIVAN.

 

FAMILY HISTORY:  Noncontributory.

 

REVIEW OF SYSTEMS:  As mentioned above in the HPI, otherwise negative.

 

PHYSICAL EXAMINATION:

VITAL SIGNS:  Blood pressure of 178/98, pulse of 95, temperature of 98.8, respirations of 16, O2 sats
 96% on room air.

GENERAL:  Well-developed, well-nourished  male in no apparent distress.

RESPIRATORY:  Clear to auscultation bilaterally.

CARDIOVASCULAR:  Regular rate and rhythm.

NEUROLOGICAL:  Mental status:  The patient is awake, alert, oriented x3.  Speech and language:  Fluen
t speech.  Cranial nerves:  Pupils are 3 mm and reactive.  Visual fields are intact.  Extraocular mus
cles are intact.  No nystagmus is noted.  Face is symmetric.  Tongue and uvula are midline.  Motor ex
am showed normal tone and bulk with 5/5 strength in both upper and lower extremities.  Sensory:  Sens
ation is intact and symmetric.  Deep tendon reflexes 2+ reflexes in both upper and lower extremities.
  Babinski:  Plantar responses flexion bilaterally.  Coordination intact to finger-nose-finger tappin
g bilaterally.

 

LABORATORY DATA:  I reviewed which included CBC, CMP, B12, folate, lactic acid, urinalysis, CSF studi
es, and plasma alcohol level, which is significant for hemoglobin 13.8, hematocrit of 40.7.  Sodium o
f 134, potassium of 3.2, AST of 44.  CSF:  WBC of 2, glucose of 88 and protein of 53, otherwise unrem
arkable.

 

IMAGING STUDIES:  CT head without contrast was reviewed, which showed no acute intracranial abnormali
ty.

 

IMPRESSION:

1.  Generalized tonic-clonic seizure.

2.  Mr. Sarah is a pleasant 62-year-old  male who presented with four episodes of generali
zed tonic-clonic seizure.  At this time, I would recommend continuing Keppra 500 mg b.i.d.  I would o
btain MRI brain with and without contrast and EEG.  I have advised him on seizure precautions includi
ng no driving, no climbing ladders and no operating heavy machinery.  I will be happy to see him in m
y clinic in to 4 to 6 weeks for followup.

 

Thank you for your consultation.

## 2017-12-05 NOTE — PRG
DATE OF SERVICE:  12/05/2017

 

SUBJECTIVE:  Mr. Sarah is still a little bit confused.  He is walking in the [place].

 

He has physical therapy with him.

 

OBJECTIVE:

VITAL SIGNS:  His blood pressure 146/97, heart rate is 108, respiratory rate is in the 20s and heart 
rate is 104.

LUNGS:  Clear.

HEART:  Regular rhythm.

ABDOMEN:  Soft.

 

IMPRESSION:

1.  Seizure disorder.  Awaiting an MRI.

2.  He has been converted to p.o. Keppra.

3.  Confusion may be in part of alcohol withdrawal.  He does well with Valium and Haldol.

4.  Childhood seizure disorder.

5.  Daily heavy alcohol intake.

 

He can move over to the stroke unit with a bed alarm or sitter, [facility] we will sign off.

## 2017-12-06 VITALS — DIASTOLIC BLOOD PRESSURE: 91 MMHG | SYSTOLIC BLOOD PRESSURE: 174 MMHG

## 2017-12-06 VITALS — TEMPERATURE: 97.6 F

## 2017-12-06 LAB
ANION GAP SERPL CALC-SCNC: 10 MMOL/L (ref 10–20)
BUN SERPL-MCNC: 4 MG/DL (ref 8.4–25.7)
CALCIUM SERPL-MCNC: 9.1 MG/DL (ref 7.8–10.44)
CHLORIDE SERPL-SCNC: 104 MMOL/L (ref 98–107)
CO2 SERPL-SCNC: 26 MMOL/L (ref 23–31)
CREAT CL PREDICTED SERPL C-G-VRATE: 90 ML/MIN (ref 70–130)
MAGNESIUM SERPL-MCNC: 1.6 MG/DL (ref 1.6–2.6)

## 2017-12-06 RX ADMIN — FAMOTIDINE SCH MG: 10 INJECTION, SOLUTION INTRAVENOUS at 08:51

## 2017-12-06 RX ADMIN — CYANOCOBALAMIN TAB 1000 MCG SCH MCG: 1000 TAB at 08:51

## 2017-12-06 RX ADMIN — THERA TABS SCH TAB: TAB at 08:50

## 2017-12-06 RX ADMIN — Medication SCH ML: at 08:51

## 2017-12-06 NOTE — PRG
DATE OF SERVICE:  12/06/2017

 

Mr. Sarah is afebrile, heart rate is 104, blood pressure 161/79, respiratory rate is 18.  Overall, 
he is unchanged.

 

I am told by the nurses on the step-down unit that his wife brought him a lunch box with alcohol in i
t yesterday, i. e., whiskey.  This shoots down her report that he only drinks 6-10 beers a day.

 

I suspect he has a seizure disorder that has been unmasked by his heavy alcohol consumption.  This is
 not clinically alcohol withdrawal.

 

His MRI showed no structural brain abnormalities.

## 2017-12-06 NOTE — MRI
MRI BRAIN WITH AND WITHOUT CONTRAST:

 

HISTORY:

A 62-year-old male with first time seizure.

 

TECHNIQUE:

Multiple sequences obtained in axial, sagittal, and coronal planes; pre and post IV injection of gado
linium-based contrast agent:  12 mL of MultiHance.

 

FINDINGS:

The ventricles are normal in size and configuration.  There is no restricted diffusion, abnormal intr
aaxial enhancement, mass, midline shift, or any other mass effect, recent intraaxial hemorrhage, or e
xtraaxial fluid collection.  There are T2 hyperintensities in the cerebral white matter, consistent w
ith chronic ischemic white matter changes due to microvascular atherosclerosis.  The left frontal sin
us is completely filled with material that is T2 hyperintense, T1 hypointense (intermediate between C
SF and gray matter), and FLAIR isointense to gray matter.  There is also material of similar signal i
ntensity filling the left maxillary sinus.

 

IMPRESSION:

1.  Mild-moderate chronic ischemic white matter changes.

 

2.  No evidence of acute or aggressive process affecting the brain.

      

3.  Mucus retention cyst completely filling the left frontal sinus.  Smaller mucus retention cyst at 
the floor of the left maxillary sinus.

 

jn[]

 

POS: MELANIE

## 2017-12-06 NOTE — DIS
DATE OF DISCHARGE:  2017

 

DISCHARGE DISPOSITION:  Home.

 

FOLLOWUP:

1.  Follow up with primary care physician, Dr. Fran Quinonez in 1 week.

2.  Follow up with Dr. Amber Tejeda as scheduled.

 

ALLERGIES:  The patient is allergic to LORAZEPAM.

 

DISCHARGE MEDICATIONS:

1.  Keppra 500 mg b.i.d.

2.  Cefuroxime 250 mg b.i.d. for the next 5 days.

3.  Multivitamin, folic acid and thiamine daily.

4.  Potassium chloride 10 mEq daily for the next 10 days.

 

Other home medications were resumed includin.  Fluoxetine 20 mg daily.

2.  Vytorin 10 mg at bedtime.

3.  Prevacid 15 mg daily.

4.  Levothyroxine 75 mcg daily.

5.  Eliquis 5 mg b.i.d.

6.  Patanol daily.

7.  Anoro Ellipta daily.

 

INPATIENT CONSULTANTS:

1.  Pulmonary, Dr. Savage.

2.  Neurology, Dr. Tejeda.

 

The patient was seen and examined on the day of discharge.  He denies any new complaints.  Mentation 
is back to normal.  He is ambulating in the hallway without any confusion or gait abnormality.

 

BRIEF HOSPITAL COURSE:  The patient is a 62-year-old white male with chronic alcoholism, COPD, and qu
estionable seizure disorder, currently on no antiepileptic drugs, presented to the emergency room wit
h seizure.  Please refer to the history and physical dated 2017 for further details.

 

The patient was admitted to the hospital with the diagnosis of seizure, rule out meningitis.  Lumbar 
puncture was performed in the emergency room due to leukocytosis with left shift.  His CSF fluid show
ed 2 WBCs with 7 RBCs with a glucose of 88, total protein of 53.  His blood culture and CSF culture h
as been negative so far.  Antibiotics were discontinued for this reason.  He was evaluated by Neurolo
gy, Dr. Tejeda.  MRI of the brain was performed earlier today that was negative for acute findings.  It
 showed mild to moderate chronic ischemic white matter changes.  It also showed some mucus retention 
cyst in the left frontal sinus and left maxillary sinus.  He has been cleared by Neurology for discha
rge on Keppra 500 mg daily.  He was advised not to drive or climb ladders or operate heavy machinery.
  He will follow up with Dr. Tejeda as outpatient.

 

FINAL DIAGNOSES:

1.  Generalized tonic clonic seizure, started on Keppra.

2.  Toxic metabolic encephalopathy, resolved, secondary to #1.

3.  History of questionable seizure disorder.

4.  Chronic alcoholism.

5.  Chronic obstructive pulmonary disease.

6.  Lactic acidosis on admission, resolved.  His lactic acid on admission was 5.0.

7.  Hypokalemia, replaced.

8.  Hyponatremia, corrected.

9.  Dehydration on admission.

10.  Leukocytosis on admission, unlikely to be infectious.

 

Plan of care was discussed with the patient in detail.  He stated understanding.  He was extensively 
counseled to quit alcohol.  He was advised to refill his Keppra through his PCP.

 

Fall precaution was emphasized.

 

Plan of care was discussed with the patient in detail.  He stated understanding.

## 2019-06-02 ENCOUNTER — HOSPITAL ENCOUNTER (EMERGENCY)
Dept: HOSPITAL 92 - ERS | Age: 64
Discharge: HOME | End: 2019-06-02
Payer: COMMERCIAL

## 2019-06-02 DIAGNOSIS — I10: ICD-10-CM

## 2019-06-02 DIAGNOSIS — I48.91: ICD-10-CM
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
## 2019-06-02 DIAGNOSIS — S00.03XA: Primary | ICD-10-CM

## 2019-06-02 DIAGNOSIS — F32.9: ICD-10-CM

## 2019-06-02 DIAGNOSIS — F17.290: ICD-10-CM

## 2019-06-02 DIAGNOSIS — Z79.899: ICD-10-CM
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
## 2019-06-02 DIAGNOSIS — F10.20: ICD-10-CM

## 2019-06-02 DIAGNOSIS — J44.9: ICD-10-CM

## 2019-06-02 DIAGNOSIS — Z79.01: ICD-10-CM

## 2019-06-02 DIAGNOSIS — K21.9: ICD-10-CM

## 2019-06-02 DIAGNOSIS — E03.9: ICD-10-CM

## 2019-06-02 PROCEDURE — 72125 CT NECK SPINE W/O DYE: CPT

## 2019-06-02 PROCEDURE — 70450 CT HEAD/BRAIN W/O DYE: CPT

## 2019-06-02 PROCEDURE — 93005 ELECTROCARDIOGRAM TRACING: CPT

## 2019-06-02 NOTE — CT
EXAM: CT of the cervical spine without contrast



HISTORY: Fall with neck pain



COMPARISON: None



TECHNIQUE: Multiple contiguous axial images were obtained in a CT of the cervical spine without contr
ast. Sagittal and coronal reformats were performed.



FINDINGS: There is remote healed fracture of the odontoid process/C2 vertebral body. The vertebral juliocesar
dies and intervertebral discs demonstrate normal height and alignment without acute fracture or

subluxation. No prevertebral soft tissue swelling is seen. Mild degenerative changes are seen through
out cervical spine.



The posterior facets are well aligned. Normal alignment of the skull base with the cervical spine is 
seen.



The lung apices and cervical soft tissues are unremarkable.



IMPRESSION: No evidence of acute osseous abnormality of the cervical spine.



Reported By: Jarrod Solomon 

Electronically Signed:  6/2/2019 10:10 PM

## 2019-06-02 NOTE — CT
EXAM: CT brain without contrast



HISTORY: Fall at a bar with possible loss of consciousness



COMPARISON: 12-17



TECHNIQUE: Multiple contiguous axial images were obtained and a CT of the brain without contrast.



FINDINGS: There are scattered hypodensities in the subcortical and periventricular white matter consi
stent with small vessel ischemic disease. There is no evidence of hydrocephalus, intracranial

hemorrhage, or extra-axial fluid collection.



The calvarium and overlying soft tissues are unremarkable. Mucosal thickening is seen in the left fro
ntal sinus. The other visualized paranasal sinuses and mastoid air cells are well aerated.



IMPRESSION: No evidence of acute intracranial abnormality



Reported By: Jarrod Solomon 

Electronically Signed:  6/2/2019 10:09 PM